# Patient Record
Sex: MALE | Race: WHITE | Employment: UNEMPLOYED | URBAN - METROPOLITAN AREA
[De-identification: names, ages, dates, MRNs, and addresses within clinical notes are randomized per-mention and may not be internally consistent; named-entity substitution may affect disease eponyms.]

---

## 2021-12-10 ENCOUNTER — TELEPHONE (OUTPATIENT)
Dept: GASTROENTEROLOGY | Age: 31
End: 2021-12-10

## 2021-12-22 ENCOUNTER — OFFICE VISIT (OUTPATIENT)
Dept: GASTROENTEROLOGY | Age: 31
End: 2021-12-22
Payer: COMMERCIAL

## 2021-12-22 VITALS — WEIGHT: 129 LBS | DIASTOLIC BLOOD PRESSURE: 81 MMHG | SYSTOLIC BLOOD PRESSURE: 148 MMHG | BODY MASS INDEX: 17.02 KG/M2

## 2021-12-22 DIAGNOSIS — K52.9 CHRONIC DIARRHEA: Primary | ICD-10-CM

## 2021-12-22 DIAGNOSIS — F41.9 ANXIETY: ICD-10-CM

## 2021-12-22 PROCEDURE — 99204 OFFICE O/P NEW MOD 45 MIN: CPT | Performed by: INTERNAL MEDICINE

## 2021-12-22 RX ORDER — BISACODYL 5 MG
TABLET, DELAYED RELEASE (ENTERIC COATED) ORAL
Qty: 2 TABLET | Refills: 0 | Status: SHIPPED | OUTPATIENT
Start: 2021-12-22

## 2021-12-22 RX ORDER — CITALOPRAM 10 MG/1
10 TABLET ORAL DAILY
Qty: 30 TABLET | Refills: 3 | Status: SHIPPED | OUTPATIENT
Start: 2021-12-22

## 2021-12-22 RX ORDER — POLYETHYLENE GLYCOL 3350 17 G/17G
POWDER, FOR SOLUTION ORAL
Qty: 238 EACH | Refills: 0 | Status: SHIPPED | OUTPATIENT
Start: 2021-12-22

## 2021-12-22 ASSESSMENT — ENCOUNTER SYMPTOMS
VOMITING: 1
CONSTIPATION: 1
ABDOMINAL PAIN: 1
BLOOD IN STOOL: 1
EYES NEGATIVE: 1
NAUSEA: 1
DIARRHEA: 1
ALLERGIC/IMMUNOLOGIC NEGATIVE: 1
RESPIRATORY NEGATIVE: 1

## 2021-12-22 NOTE — PROGRESS NOTES
GI OFFICE FOLLOW UP    INTERVAL HISTORY:   No referring provider defined for this encounter. Chief Complaint   Patient presents with    Follow-up     diarrhea, nausea        1. Chronic diarrhea    2. Anxiety              HISTORY OF PRESENT ILLNESS: Brie Sheriff is a 36 y.o. male with a past history remarkable for ,   Patient seen along with his mother. Last time this patient was seen by me was in 2015. At present chief complaint is that he is having significant diarrhea. He has as many as 4-5 bowel movements a day, liquid consistency, moderate amount. On occasion he does have nocturnal bowel movements. Has abdominal bloating and cramps. Also had right lower quadrant pain. The pain appears to be more or less constant. No radiation. Not associated with micturition. Denies urinary symptoms. No hematuria. No history of back pain. He has a fair appetite. However he has 10 to 15 pound weight loss in the last few months. Patient states that the diarrhea is getting worse in the last 6 months. Has intermittent nausea and emesis. No hematemesis. No dysphagia. No heartburns. This patient had stress and anxiety. His previous records reviewed from 1. At that time he had a similar symptoms of diarrhea. Work-up was negative. In the last 6 years patient did well until about a year ago. At present he is not on any medications for stress and anxiety. Patient visiting emergency room with symptoms of diarrhea, abdominal pain and nausea. Past Medical,Family, and Social History reviewed and does contribute to the patient presenting condition. Patient's PMH/PSH,SH,PSYCH Hx, MEDs, ALLERGIES, and ROS were all reviewed and updated in the appropriate sections.  Yes      PAST MEDICAL HISTORY:  Past Medical History:   Diagnosis Date    Abdominal discomfort  GERD (gastroesophageal reflux disease)        Past Surgical History:   Procedure Laterality Date    COLONOSCOPY  1/15/15    normal    UPPER GASTROINTESTINAL ENDOSCOPY  5/2012    DOMINIQUE Marinelli grade A erosive esophagitis       CURRENT MEDICATIONS:    Current Outpatient Medications:     citalopram (CELEXA) 10 MG tablet, Take 1 tablet by mouth daily, Disp: 30 tablet, Rfl: 3    ondansetron (ZOFRAN ODT) 4 MG disintegrating tablet, Take 1 tablet by mouth every 8 hours as needed for Nausea, Disp: 6 tablet, Rfl: 0    bisacodyl (BISACODYL) 5 MG EC tablet, Follow instructions provided given by the physician's office. , Disp: 2 tablet, Rfl: 0    polyethylene glycol (GLYCOLAX) 17 GM/SCOOP powder, Please follow instructions given to you by physician's office. , Disp: 238 each, Rfl: 0    ALLERGIES:   Allergies   Allergen Reactions    Duricef [Cefadroxil]        FAMILY HISTORY:       Problem Relation Age of Onset    Cancer Maternal Aunt         sarcoma    Cancer Maternal Uncle         colon    Cancer Maternal Grandfather         esophagus         SOCIAL HISTORY:   Social History     Socioeconomic History    Marital status: Single     Spouse name: Not on file    Number of children: Not on file    Years of education: Not on file    Highest education level: Not on file   Occupational History    Not on file   Tobacco Use    Smoking status: Never Smoker    Smokeless tobacco: Former User   Substance and Sexual Activity    Alcohol use: No     Alcohol/week: 0.0 standard drinks    Drug use: Yes     Types: Marijuana Maurita Handsome)    Sexual activity: Not on file   Other Topics Concern    Not on file   Social History Narrative    Not on file     Social Determinants of Health     Financial Resource Strain:     Difficulty of Paying Living Expenses: Not on file   Food Insecurity:     Worried About Running Out of Food in the Last Year: Not on file    Joann of Food in the Last Year: Not on file   Transportation Needs:     Lack of Transportation (Medical): Not on file    Lack of Transportation (Non-Medical): Not on file   Physical Activity:     Days of Exercise per Week: Not on file    Minutes of Exercise per Session: Not on file   Stress:     Feeling of Stress : Not on file   Social Connections:     Frequency of Communication with Friends and Family: Not on file    Frequency of Social Gatherings with Friends and Family: Not on file    Attends Orthodox Services: Not on file    Active Member of [address] [place] [address] or Organizations: Not on file    Attends Club or Organization Meetings: Not on file    Marital Status: Not on file   Intimate Partner Violence:     Fear of Current or Ex-Partner: Not on file    Emotionally Abused: Not on file    Physically Abused: Not on file    Sexually Abused: Not on file   Housing Stability:     Unable to Pay for Housing in the Last Year: Not on file    Number of [place] in the Last Year: Not on file    Unstable Housing in the Last Year: Not on file         REVIEW OF SYSTEMS:         Review of Systems   Constitutional: Positive for appetite change. HENT: Negative. Eyes: Negative. Respiratory: Negative. Cardiovascular: Negative. Gastrointestinal: Positive for abdominal pain, blood in stool, constipation, diarrhea, nausea and vomiting. Endocrine: Negative. Genitourinary: Negative. Musculoskeletal: Negative. Skin: Negative. Allergic/Immunologic: Negative. Neurological: Negative. Hematological: Negative. Psychiatric/Behavioral: Negative. PHYSICAL EXAMINATION:     Vital signs reviewed per the nursing documentation. BP (!) 148/81   Wt 129 lb (58.5 kg)   BMI 17.02 kg/m²   Body mass index is 17.02 kg/m². Physical Exam  Vitals reviewed. Constitutional:       Appearance: Normal appearance. Comments: Thinly built patient. HENT:      Head: Normocephalic and atraumatic. Eyes:      Extraocular Movements: Extraocular movements intact. Conjunctiva/sclera: Conjunctivae normal.   Cardiovascular:      Rate and Rhythm: Normal rate and regular rhythm. Heart sounds: Normal heart sounds. Pulmonary:      Effort: Pulmonary effort is normal.      Breath sounds: Normal breath sounds. Abdominal:      General: Bowel sounds are normal. There is no distension. Palpations: Abdomen is soft. There is no mass. Tenderness: There is no abdominal tenderness. There is no guarding. Hernia: No hernia is present. Comments: Discomfort in the right lower quadrant. No rebound or guarding. Not able to feel mass. Skin:     General: Skin is warm and dry. Neurological:      General: No focal deficit present. Mental Status: He is alert and oriented to person, place, and time. Psychiatric:         Mood and Affect: Mood normal.         Behavior: Behavior normal.           LABORATORY DATA: Reviewed  Lab Results   Component Value Date    WBC 9.0 10/22/2015    HGB 14.9 10/22/2015    HCT 45.1 10/22/2015    MCV 94.1 10/22/2015     10/22/2015     10/22/2015    K 4.2 10/22/2015    CL 98 10/22/2015    CO2 28 10/22/2015    BUN 12 10/22/2015    CREATININE 0.76 10/22/2015    LABALBU 4.8 10/22/2015    BILITOT 0.47 10/22/2015    ALKPHOS 46 10/22/2015    AST 20 10/22/2015    ALT 17 10/22/2015         Lab Results   Component Value Date    RBC 4.79 10/22/2015    HGB 14.9 10/22/2015    MCV 94.1 10/22/2015    MCH 31.1 10/22/2015    MCHC 33.1 10/22/2015    RDW 12.3 10/22/2015    MPV 9.3 10/22/2015    BASOPCT 0 10/22/2015    LYMPHSABS 0.90 (L) 10/22/2015    MONOSABS 0.40 10/22/2015    NEUTROABS 7.60 10/22/2015    EOSABS 0.10 10/22/2015    BASOSABS 0.00 10/22/2015         DIAGNOSTIC TESTING:     No results found. Assessment  1. Chronic diarrhea    2. Anxiety        Plan  This patient has vague discomfort in the right lower quadrant. Given that he has significant diarrhea, weight loss need to evaluate possibility of ileitis.   6 years ago work-up regarding diarrhea was negative. This patient has significant anxiety, stressful lifestyle and psychiatric issues. Discussed with the patient and mother regarding these issues and advised to seek help from behavioral health services. Until then I advised him to take Celexa 10 mg at bedtime to see whether this will help him. This patient may need colonoscopy to evaluate colonic pathology ileitis etc. under this are arranged. The labs that were done recently that he is on 11/27/2021 including CBC, lipase and CMP within normal limits. Basing on the results of this colonoscopy will plan further management. Discussed with the patient and patient's mother regarding side effects of citalopram and to contact me if he develops any. Thank you for allowing me to participate in the care of Mr. Shellie Lopez. For any further questions please do not hesitate to contact me. I have reviewed and agree with the ROS entered by the MA/LPN. Note is dictated utilizing voice recognition software. Unfortunately this leads to occasional typographical errors.  Please contact our office if you have any questions        Renan MD Malvin,FACP, [facility]  Board Certified in Gastroenterology and [address] [place] [address] Gastroenterology  Office #: [phone]

## 2022-01-20 ENCOUNTER — HOSPITAL ENCOUNTER (OUTPATIENT)
Dept: LAB | Age: 32
Setting detail: SPECIMEN
Discharge: HOME | End: 2022-01-20

## 2022-01-20 DIAGNOSIS — Z01.818 PRE-OP TESTING: Primary | ICD-10-CM

## 2022-03-30 ENCOUNTER — TELEPHONE (OUTPATIENT)
Dept: GASTROENTEROLOGY | Age: 32
End: 2022-03-30

## 2022-03-30 NOTE — TELEPHONE ENCOUNTER
Pt mother called wanting OV notes sent to his job. Writer called mom and lvm with medical records number.